# Patient Record
Sex: FEMALE | Race: WHITE | NOT HISPANIC OR LATINO
[De-identification: names, ages, dates, MRNs, and addresses within clinical notes are randomized per-mention and may not be internally consistent; named-entity substitution may affect disease eponyms.]

---

## 2017-02-15 NOTE — PATIENT DISCUSSION
RETINAL EDEMA OS: STABLE MINIMAL EDEMA BUT NO SYMPTOMATIC. STOP PROLENSA. PT TO CALL IF SHE FEELS VISION IS WORSE AFTER STOPPING PROLENSA. FOLLOW UP AS SCHEDULED.

## 2017-05-17 NOTE — PATIENT DISCUSSION
RETINAL EDEMA OS: EDEMA IMPROVED OFF OF DROPS. PT TO CONTINUE TO FOLLOW UP WITH DR MELENDEZ FOR FURTHER EYE EXAMS.

## 2022-10-04 ENCOUNTER — NEW PATIENT (OUTPATIENT)
Age: 87
End: 2022-10-04

## 2022-10-04 DIAGNOSIS — Z96.1: ICD-10-CM

## 2022-10-04 DIAGNOSIS — H04.123: ICD-10-CM

## 2022-10-04 PROCEDURE — 92499OPKAL KAL OPTOMAP RETINAL SCREENING, ELECTIVE

## 2022-10-04 PROCEDURE — 92015 DETERMINE REFRACTIVE STATE: CPT

## 2022-10-04 PROCEDURE — 99204 OFFICE O/P NEW MOD 45 MIN: CPT

## 2022-10-04 ASSESSMENT — KERATOMETRY
OS_K2POWER_DIOPTERS: 45.75
OD_AXISANGLE_DEGREES: 105
OS_AXISANGLE2_DEGREES: 54
OD_K2POWER_DIOPTERS: 45.50
OD_AXISANGLE2_DEGREES: 15
OS_K1POWER_DIOPTERS: 45.50
OD_K1POWER_DIOPTERS: 45.25
OS_AXISANGLE_DEGREES: 144

## 2022-10-04 ASSESSMENT — VISUAL ACUITY
OD_CC: 20/25
OS_SC: 20/40-1
OD_SC: 20/25
OS_SC: 20/25
OU_SC: 20/30
OU_CC: 20/20
OU_SC: 20/25
OD_CC: 20/25-2
OU_CC: 20/25-2
OS_CC: 20/40
OD_SC: 20/40
OS_CC: 20/30

## 2022-10-04 ASSESSMENT — TONOMETRY
OS_IOP_MMHG: 12
OD_IOP_MMHG: 11

## 2023-06-19 NOTE — PATIENT DISCUSSION
Discussed the importance of blood sugar control in the prevention of ocular complications. Mirvaso Pregnancy And Lactation Text: This medication has not been assigned a Pregnancy Risk Category. It is unknown if the medication is excreted in breast milk.